# Patient Record
Sex: FEMALE | Race: WHITE | NOT HISPANIC OR LATINO | Employment: PART TIME | URBAN - METROPOLITAN AREA
[De-identification: names, ages, dates, MRNs, and addresses within clinical notes are randomized per-mention and may not be internally consistent; named-entity substitution may affect disease eponyms.]

---

## 2018-11-06 ENCOUNTER — OFFICE VISIT (OUTPATIENT)
Dept: MEDICAL GROUP | Facility: MEDICAL CENTER | Age: 48
End: 2018-11-06
Payer: COMMERCIAL

## 2018-11-06 ENCOUNTER — PATIENT MESSAGE (OUTPATIENT)
Dept: MEDICAL GROUP | Facility: MEDICAL CENTER | Age: 48
End: 2018-11-06

## 2018-11-06 VITALS
OXYGEN SATURATION: 96 % | RESPIRATION RATE: 16 BRPM | SYSTOLIC BLOOD PRESSURE: 110 MMHG | BODY MASS INDEX: 30.73 KG/M2 | HEART RATE: 89 BPM | DIASTOLIC BLOOD PRESSURE: 88 MMHG | HEIGHT: 64 IN | WEIGHT: 180 LBS | TEMPERATURE: 98.7 F

## 2018-11-06 DIAGNOSIS — Z86.32 HISTORY OF GESTATIONAL DIABETES: ICD-10-CM

## 2018-11-06 DIAGNOSIS — E78.00 PURE HYPERCHOLESTEROLEMIA: ICD-10-CM

## 2018-11-06 DIAGNOSIS — Z00.00 ANNUAL PHYSICAL EXAM: ICD-10-CM

## 2018-11-06 DIAGNOSIS — Z23 NEED FOR VACCINATION: ICD-10-CM

## 2018-11-06 DIAGNOSIS — F41.9 ANXIETY: ICD-10-CM

## 2018-11-06 DIAGNOSIS — Z72.0 TOBACCO USE: ICD-10-CM

## 2018-11-06 DIAGNOSIS — N92.0 MENORRHAGIA WITH REGULAR CYCLE: ICD-10-CM

## 2018-11-06 DIAGNOSIS — Z85.828 HISTORY OF BASAL CELL CANCER: ICD-10-CM

## 2018-11-06 DIAGNOSIS — Z13.21 ENCOUNTER FOR VITAMIN DEFICIENCY SCREENING: ICD-10-CM

## 2018-11-06 DIAGNOSIS — Z12.31 ENCOUNTER FOR SCREENING MAMMOGRAM FOR BREAST CANCER: ICD-10-CM

## 2018-11-06 PROCEDURE — 99386 PREV VISIT NEW AGE 40-64: CPT | Mod: 25 | Performed by: NURSE PRACTITIONER

## 2018-11-06 PROCEDURE — 90686 IIV4 VACC NO PRSV 0.5 ML IM: CPT | Performed by: NURSE PRACTITIONER

## 2018-11-06 PROCEDURE — 90471 IMMUNIZATION ADMIN: CPT | Performed by: NURSE PRACTITIONER

## 2018-11-06 RX ORDER — ALPRAZOLAM 0.5 MG/1
0.5 TABLET ORAL
Qty: 30 TAB | Refills: 0 | Status: SHIPPED
Start: 2018-11-06 | End: 2019-02-04

## 2018-11-06 ASSESSMENT — PATIENT HEALTH QUESTIONNAIRE - PHQ9: CLINICAL INTERPRETATION OF PHQ2 SCORE: 0

## 2018-11-06 NOTE — ASSESSMENT & PLAN NOTE
Diet controlled diabetes with her last pregnancy.  She has her glucose monitored annually and most recent values have been normal.  She is watching her diet, exercising

## 2018-11-06 NOTE — ASSESSMENT & PLAN NOTE
Smoking about half pack a day, planning to quit although she has not yet set a quit date.  She is quit in the past cold turkey.  She had tried Chantix a few years ago but this caused extreme anxiety and insomnia.  She does not feel that she needs help with this currently

## 2018-11-06 NOTE — PROGRESS NOTES
Chief Complaint   Patient presents with   • Establish Care     SOMETHING ON SCALP   • Immunizations     FLU     Lisa Conner is a 48 y.o. female here to establish care and requesting annual exam.  Last Pap was 1 year ago, normal.  We discussed:    History of basal cell cancer  Excised from L face about 10 years ago, due for skin cancer screening and needing to establish with a new dermatologist.  She did notice a red spot on the right scalp which she feels is new.  No irritation, itching, does not appear to be growing    Pure hypercholesterolemia  Mildly elevated LDL in the past. Watching her diet, exercising although having difficulty getting into a regular routine due to her schedule    History of gestational diabetes  Diet controlled diabetes with her last pregnancy.  She has her glucose monitored annually and most recent values have been normal.  She is watching her diet, exercising    Anxiety  Rare issues, history of panic attack.  Started having difficulty around the time of her divorce, overall symptoms have improved but she still has occasional episodes.  Managing with Xanax as needed-not more than 1 time weekly.  Denies any depression, appetite changes, insomnia    Tobacco use  Smoking about half pack a day, planning to quit although she has not yet set a quit date.  She is quit in the past cold turkey.  She had tried Chantix a few years ago but this caused extreme anxiety and insomnia.  She does not feel that she needs help with this currently    Menorrhagia with regular cycle  Periods have become increasingly heavy over the last several years, cycle is regular, about every 28 days.  Heavy bleeding the first several days, menses last 6-7 days total.  She had been seeing an OB/GYN when living in [place], ablation had been discussed but she did not pursue this.  She denies any history of anemia.  No fatigue, shortness of breath, dizziness    Current medicines (including changes  "today)  Current Outpatient Prescriptions   Medication Sig Dispense Refill   • ALPRAZolam (XANAX PO) Take  by mouth.       No current facility-administered medications for this visit.      She  has no past medical history on file.  She  has no past surgical history on file.  Social History   Substance Use Topics   • Smoking status: Current Some Day Smoker   • Smokeless tobacco: Never Used   • Alcohol use Yes      Comment: WINE     Social History     Social History Narrative   • No narrative on file     Family History   Problem Relation Age of Onset   • Rheumatologic Disease Mother    • Diabetes Paternal Uncle    • Stroke Maternal Grandmother    • Other Maternal Grandmother         parkinsons   • Diabetes Paternal Grandmother    • Hypertension Paternal Grandmother      Family Status   Relation Status   • Mo Alive   • Fa         MVA   • Sis Alive   • Bro Alive   • PUnc Alive   • MGMo    • PGMo          ROS  Problems listed discussed above, all other systems reviewed and negative     Objective:     Blood pressure 110/88, pulse 89, temperature 37.1 °C (98.7 °F), temperature source Temporal, resp. rate 16, height 1.626 m (5' 4\"), weight 81.6 kg (180 lb), SpO2 96 %. Body mass index is 30.9 kg/m².  Physical Exam:  General: Alert, oriented in no acute distress.  Eye contact is good, speech is normal, affect calm  HEENT:  EOMI, perrl, Oral mucosa pink moist, no lesions. Nares patent.  Less than 1 cm hemangioma in the right hairline.  TMs gray with good landmarks bilaterally. No cervical or supraclavicular lymphadenopathy, thyroid isthmus palpable without masses or nodules.  Lungs: clear to auscultation bilaterally, good aeration, normal effort. No wheeze/ rhonchi/ rales.  CV: regular rate and rhythm, S1, S2. No murmur, no edema.  Pedal pulses 2 + bilaterally  Abdomen: soft, nontender, BS x4, no hepatosplenomegaly.  Ext: color normal, vascularity normal, temperature normal. No rash or lesions.  MS: No " "joint swelling or redness. Strength is 5/5 globally  Neuro: DTR 2+ bilaterally  Assessment and Plan:   The following treatment plan was discussed   1. Annual physical exam  Normal physical exam. General health and wellness discussion including healthy diet, regular exercise. 2000 iu Vit d3 advised daily. Preventative health screenings as listed below. Advised regular dental cleanings, eye exam yearly.   2. History of basal cell cancer   excised from the face 10 years ago.  REFERRAL TO DERMATOLOGY   3. History of gestational diabetes   watching her diet, glucose has been normal in recent years  COMP METABOLIC PANEL   4. Pure hypercholesterolemia   mildly elevated LDL in the past, continue to monitor.  Counseled on diet  LIPID PROFILE    COMP METABOLIC PANEL   5. Menorrhagia with regular cycle   she was seeing an OB/GYN when living in [place], ablation had been discussed but she did not follow through.  Denies any history of anemia.  No significant dysmenorrhea.  Evaluate labs.  She does smoke, not be a good candidate for OCP  CBC WITH DIFFERENTIAL    CBC WITH DIFFERENTIAL   6. Anxiety   rare issues managing with Xanax, typically 1 time weekly.  I will refill medication, she is to contact me with the dose.  We have discussed that this is a controlled substance, if she sees an increase in need she would benefit from alternative medication such as SSRI.  I explained she will need an appointment with me for refill, she verbalizes understanding.   report reviewed  TSH WITH REFLEX TO FT4   7. Encounter for screening mammogram for breast cancer  MA-SCREEN MAMMO W/CAD-BILAT   8. Tobacco use   cessation encouraged, she has not yet set quit date.  She is quit in the past \"cold turkey\", does not feel that she needs assistance with this.  Tobacco cessation program referral offered, patient declines  CBC WITH DIFFERENTIAL   9. Encounter for vitamin deficiency screening  VITAMIN D,25 HYDROXY   10. Need for vaccination  I " have placed the below orders and discussed them with an approved delegating provider. The MA is performing the below orders under the direction of Dr. Nancy Eaton Inj >3 Year Pre-Filled PF       Educated in proper administration of medication(s) ordered today including safety, possible SE, risks, benefits, rationale and alternatives to therapy.     Followup: Pending labs             Please note that this dictation was created using voice recognition software. I have worked with consultants from the vendor as well as technical experts from [facility] [employer] to optimize the interface. I have made every reasonable attempt to correct obvious errors, but I expect that there are errors of grammar and possibly content that I did not discover before finalizing the note.

## 2018-11-06 NOTE — ASSESSMENT & PLAN NOTE
Periods have become increasingly heavy over the last several years, cycle is regular, about every 28 days.  Heavy bleeding the first several days, menses last 6-7 days total.  She had been seeing an OB/GYN when living in [place], ablation had been discussed but she did not pursue this.  She denies any history of anemia.  No fatigue, shortness of breath, dizziness

## 2018-11-06 NOTE — ASSESSMENT & PLAN NOTE
Excised from L face about 10 years ago, due for skin cancer screening and needing to establish with a new dermatologist.  She did notice a red spot on the right scalp which she feels is new.  No irritation, itching, does not appear to be growing

## 2018-11-06 NOTE — ASSESSMENT & PLAN NOTE
Rare issues, history of panic attack.  Started having difficulty around the time of her divorce, overall symptoms have improved but she still has occasional episodes.  Managing with Xanax as needed-not more than 1 time weekly.  Denies any depression, appetite changes, insomnia

## 2018-12-24 ENCOUNTER — HOSPITAL ENCOUNTER (OUTPATIENT)
Dept: LAB | Facility: MEDICAL CENTER | Age: 48
End: 2018-12-24
Attending: NURSE PRACTITIONER
Payer: COMMERCIAL

## 2018-12-24 DIAGNOSIS — E78.00 PURE HYPERCHOLESTEROLEMIA: ICD-10-CM

## 2018-12-24 DIAGNOSIS — Z13.21 ENCOUNTER FOR VITAMIN DEFICIENCY SCREENING: ICD-10-CM

## 2018-12-24 DIAGNOSIS — Z86.32 HISTORY OF GESTATIONAL DIABETES: ICD-10-CM

## 2018-12-24 DIAGNOSIS — N92.0 MENORRHAGIA WITH REGULAR CYCLE: ICD-10-CM

## 2018-12-24 DIAGNOSIS — F41.9 ANXIETY: ICD-10-CM

## 2018-12-24 DIAGNOSIS — Z72.0 TOBACCO USE: ICD-10-CM

## 2018-12-24 LAB
25(OH)D3 SERPL-MCNC: 18 NG/ML (ref 30–100)
ALBUMIN SERPL BCP-MCNC: 4 G/DL (ref 3.2–4.9)
ALBUMIN/GLOB SERPL: 1.5 G/DL
ALP SERPL-CCNC: 70 U/L (ref 30–99)
ALT SERPL-CCNC: 17 U/L (ref 2–50)
ANION GAP SERPL CALC-SCNC: 8 MMOL/L (ref 0–11.9)
AST SERPL-CCNC: 16 U/L (ref 12–45)
BASOPHILS # BLD AUTO: 0.9 % (ref 0–1.8)
BASOPHILS # BLD: 0.05 K/UL (ref 0–0.12)
BILIRUB SERPL-MCNC: 0.6 MG/DL (ref 0.1–1.5)
BUN SERPL-MCNC: 12 MG/DL (ref 8–22)
CALCIUM SERPL-MCNC: 9.8 MG/DL (ref 8.5–10.5)
CHLORIDE SERPL-SCNC: 106 MMOL/L (ref 96–112)
CHOLEST SERPL-MCNC: 173 MG/DL (ref 100–199)
CO2 SERPL-SCNC: 26 MMOL/L (ref 20–33)
CREAT SERPL-MCNC: 0.82 MG/DL (ref 0.5–1.4)
EOSINOPHIL # BLD AUTO: 0.11 K/UL (ref 0–0.51)
EOSINOPHIL NFR BLD: 2 % (ref 0–6.9)
ERYTHROCYTE [DISTWIDTH] IN BLOOD BY AUTOMATED COUNT: 46.7 FL (ref 35.9–50)
FASTING STATUS PATIENT QL REPORTED: NORMAL
GLOBULIN SER CALC-MCNC: 2.7 G/DL (ref 1.9–3.5)
GLUCOSE SERPL-MCNC: 101 MG/DL (ref 65–99)
HCT VFR BLD AUTO: 43.1 % (ref 37–47)
HDLC SERPL-MCNC: 59 MG/DL
HGB BLD-MCNC: 14.4 G/DL (ref 12–16)
IMM GRANULOCYTES # BLD AUTO: 0.01 K/UL (ref 0–0.11)
IMM GRANULOCYTES NFR BLD AUTO: 0.2 % (ref 0–0.9)
LDLC SERPL CALC-MCNC: 87 MG/DL
LYMPHOCYTES # BLD AUTO: 1.18 K/UL (ref 1–4.8)
LYMPHOCYTES NFR BLD: 21.9 % (ref 22–41)
MCH RBC QN AUTO: 33.3 PG (ref 27–33)
MCHC RBC AUTO-ENTMCNC: 33.4 G/DL (ref 33.6–35)
MCV RBC AUTO: 99.5 FL (ref 81.4–97.8)
MONOCYTES # BLD AUTO: 0.45 K/UL (ref 0–0.85)
MONOCYTES NFR BLD AUTO: 8.3 % (ref 0–13.4)
NEUTROPHILS # BLD AUTO: 3.59 K/UL (ref 2–7.15)
NEUTROPHILS NFR BLD: 66.7 % (ref 44–72)
NRBC # BLD AUTO: 0 K/UL
NRBC BLD-RTO: 0 /100 WBC
PLATELET # BLD AUTO: 187 K/UL (ref 164–446)
PMV BLD AUTO: 11.2 FL (ref 9–12.9)
POTASSIUM SERPL-SCNC: 4.3 MMOL/L (ref 3.6–5.5)
PROT SERPL-MCNC: 6.7 G/DL (ref 6–8.2)
RBC # BLD AUTO: 4.33 M/UL (ref 4.2–5.4)
SODIUM SERPL-SCNC: 140 MMOL/L (ref 135–145)
TRIGL SERPL-MCNC: 137 MG/DL (ref 0–149)
TSH SERPL DL<=0.005 MIU/L-ACNC: 2.13 UIU/ML (ref 0.38–5.33)
WBC # BLD AUTO: 5.4 K/UL (ref 4.8–10.8)

## 2018-12-24 PROCEDURE — 82306 VITAMIN D 25 HYDROXY: CPT

## 2018-12-24 PROCEDURE — 85025 COMPLETE CBC W/AUTO DIFF WBC: CPT

## 2018-12-24 PROCEDURE — 80061 LIPID PANEL: CPT

## 2018-12-24 PROCEDURE — 84443 ASSAY THYROID STIM HORMONE: CPT

## 2018-12-24 PROCEDURE — 80053 COMPREHEN METABOLIC PANEL: CPT

## 2018-12-24 PROCEDURE — 36415 COLL VENOUS BLD VENIPUNCTURE: CPT

## 2018-12-27 RX ORDER — ERGOCALCIFEROL 1.25 MG/1
50000 CAPSULE ORAL
Qty: 12 CAP | Refills: 0 | Status: SHIPPED | OUTPATIENT
Start: 2018-12-27

## 2019-03-04 ENCOUNTER — PATIENT MESSAGE (OUTPATIENT)
Dept: MEDICAL GROUP | Facility: MEDICAL CENTER | Age: 49
End: 2019-03-04

## 2019-03-05 ENCOUNTER — PATIENT MESSAGE (OUTPATIENT)
Dept: MEDICAL GROUP | Facility: MEDICAL CENTER | Age: 49
End: 2019-03-05

## 2019-03-05 NOTE — TELEPHONE ENCOUNTER
From: Lisa Conner  To: BETZY Ovalle  Sent: 3/5/2019 7:24 AM PST  Subject: Prescription Question    Thank you!  Can you please send it to the [facility] on [address] in [facility]?  ----- Message -----  From: BETZY Ovalle  Sent: 3/5/2019 7:15 AM PST  To: Lisa Conner  Subject: RE: Prescription Question  Ok, I will send in an alternative!  Rach ESPINO      ----- Message -----   From: Lisa Conner   Sent: 3/4/2019 7:05 AM PST   To: BETZY Ovalle  Subject: Prescription Question    Hi Jennifer Quispe has been having some bleed through during her monthly cycle even though she has been taking her Birth control everyday. I suspect she needs a stronger BC. Can you prescribe her a different on?  Thank you,  Lisa Conner  [phone]

## 2019-03-26 ENCOUNTER — PATIENT MESSAGE (OUTPATIENT)
Dept: MEDICAL GROUP | Facility: MEDICAL CENTER | Age: 49
End: 2019-03-26

## 2019-03-26 DIAGNOSIS — E55.9 VITAMIN D DEFICIENCY: ICD-10-CM

## 2019-03-26 NOTE — TELEPHONE ENCOUNTER
From: Lisa Conner  To: BETZY Ovalle  Sent: 3/26/2019 7:14 AM PDT  Subject: Non-Urgent Medical Question    Good morning,   I finished the Vitamin D Rx last Wednesday. Just wondering I need to do more labs?    Thanks,  Lisa Conner

## 2019-04-08 ENCOUNTER — PATIENT MESSAGE (OUTPATIENT)
Dept: MEDICAL GROUP | Facility: MEDICAL CENTER | Age: 49
End: 2019-04-08

## 2019-04-08 NOTE — TELEPHONE ENCOUNTER
From: Lisa Conner  To: BETZY Ovalle  Sent: 2019 11:00 AM PDT  Subject: Non-Urgent Medical Question    Okay do you have anything available for her tomorrow before 1 p.m.?  ----- Message -----  From: BETZY Ovalle  Sent: 2019 8:28 AM PDT  To: Lisa Conner  Subject: RE: Non-Urgent Medical Question  We can treat them with liquid nitrogen in our office but it can take multiple treatments if they are very thick. You would have to see a dermatologist for laser treatment, I'm not sure if this is covered by insurance.  Rach ESPINO      ----- Message -----   From: Lisa Conner   Sent: 2019 6:10 AM PDT   To: BETZY Ovalle  Subject: Non-Urgent Medical Question    Good morning,  My daughter Sabi Conner  2001 has a few warts on her knee. It seems that she gets them every spring and we bring her in to get them frozen. Is this something you can do? Also the last DrGail said we may have to have them lasered off since they keep coming back in the same area.    Thank you,  Lisa Conner